# Patient Record
Sex: MALE | Race: ASIAN | NOT HISPANIC OR LATINO | Employment: STUDENT | URBAN - METROPOLITAN AREA
[De-identification: names, ages, dates, MRNs, and addresses within clinical notes are randomized per-mention and may not be internally consistent; named-entity substitution may affect disease eponyms.]

---
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
## 2023-01-28 DIAGNOSIS — J30.1 SEASONAL ALLERGIC RHINITIS DUE TO POLLEN: ICD-10-CM

## 2023-01-30 RX ORDER — FLUTICASONE PROPIONATE 50 MCG
1 SPRAY, SUSPENSION (ML) NASAL DAILY
Qty: 48 G | Refills: 1 | Status: SHIPPED | OUTPATIENT
Start: 2023-01-30

## 2023-02-09 DIAGNOSIS — J30.1 SEASONAL ALLERGIC RHINITIS DUE TO POLLEN: ICD-10-CM

## 2023-02-09 RX ORDER — LORATADINE 10 MG/1
TABLET ORAL
Qty: 90 TABLET | Refills: 1 | Status: SHIPPED | OUTPATIENT
Start: 2023-02-09 | End: 2023-07-31

## 2023-07-30 DIAGNOSIS — J30.1 SEASONAL ALLERGIC RHINITIS DUE TO POLLEN: ICD-10-CM

## 2023-07-31 RX ORDER — LORATADINE 10 MG/1
TABLET ORAL
Qty: 90 TABLET | Refills: 1 | Status: SHIPPED | OUTPATIENT
Start: 2023-07-31 | End: 2024-04-02

## 2024-04-02 ENCOUNTER — OFFICE VISIT (OUTPATIENT)
Dept: FAMILY MEDICINE | Facility: CLINIC | Age: 19
End: 2024-04-02
Payer: COMMERCIAL

## 2024-04-02 VITALS
DIASTOLIC BLOOD PRESSURE: 75 MMHG | TEMPERATURE: 97.1 F | HEIGHT: 65 IN | SYSTOLIC BLOOD PRESSURE: 129 MMHG | OXYGEN SATURATION: 97 % | HEART RATE: 69 BPM | WEIGHT: 178.4 LBS | RESPIRATION RATE: 20 BRPM | BODY MASS INDEX: 29.72 KG/M2

## 2024-04-02 DIAGNOSIS — S46.811A TRAPEZIUS STRAIN, RIGHT, INITIAL ENCOUNTER: Primary | ICD-10-CM

## 2024-04-02 PROCEDURE — 99214 OFFICE O/P EST MOD 30 MIN: CPT | Performed by: FAMILY MEDICINE

## 2024-04-02 RX ORDER — CYCLOBENZAPRINE HCL 10 MG
10 TABLET ORAL 2 TIMES DAILY PRN
Qty: 30 TABLET | Refills: 0 | Status: SHIPPED | OUTPATIENT
Start: 2024-04-02

## 2024-04-02 NOTE — PROGRESS NOTES
"Assessment & Plan   Atraumatic neck pain radiating to shoulder, x 1 week, with exam consistent with right trapezius strain.  No neuro red flags.  -- Allergic to NSAIDs.  Trial of topical diclofenac.  -- Also ordered cyclobenzaprine 10 mg BID PRN, mostly to take at night due to potential for somnolence, though that may be helpful given that he came in today because of pain interfering with sleep.  -- Provided with stretching video for trapezius.    Follow-up as needed.    Subjective   Dennis Reeder is a 18 year old male with a history including urticaria and obesity who presents for neck pain.    Mr. Reeder reports experiencing neck pain for the past week, which has worsened since yesterday. He describes the pain as stiff and achy, primarily located on the right side, and notes that it radiates to his right shoulder blade. He mentions an inability to twist his neck or move it freely.  It started upon waking up one week ago without any preceding injury or trauma. The pain became more severe after taking a shower yesterday, and he reports that it interfered with his ability to sleep last night, prompting his visit today. He notes the pain has been impacting his ability to participate in workouts at the gym and play soccer. Despite attempting stretches found on YouTube and applying ice to the area, he reports no improvement. He denies experiencing any imbalance, vision changes, tremor, vertigo, numbness, or tingling.    Family history: Family history of cerebellar ataxia.  He has a 50% risk of obtaining spinocerebellar ataxia.    Social: He reports that he has never smoked. He has never used smokeless tobacco. He reports that he does not drink alcohol and does not use drugs.    Objective   Vitals: /75 (BP Location: Right arm, Patient Position: Sitting, Cuff Size: Adult Regular)   Pulse 69   Temp 97.1  F (36.2  C) (Tympanic)   Resp 20   Ht 1.641 m (5' 4.6\")   Wt 80.9 kg (178 lb 6.4 oz)   SpO2 97%   BMI 30.06 kg/m  "   General: Pleasant. Young man. No distress.  Neck: Normal to inspection.  Decreased ROM in forward flexion to ~15 degrees.  Full ROM in extension, lateral flexion, and rotation.  CN XII intact, but pain at right trap with resisted shoulder shrug.  No cervical spinous processes tenderness.  Negative Spurling's bilaterally.  Biceps, triceps, and brachioradialis reflexes symmetric and intact.  Sensation intact throughout upper extremities bilaterally.  Psych: Appropriate grooming and hygiene. Speech normal rate. Appropriate mood and affect.